# Patient Record
Sex: FEMALE | URBAN - METROPOLITAN AREA
[De-identification: names, ages, dates, MRNs, and addresses within clinical notes are randomized per-mention and may not be internally consistent; named-entity substitution may affect disease eponyms.]

---

## 2023-08-04 ENCOUNTER — OFFICE (OUTPATIENT)
Dept: URBAN - METROPOLITAN AREA CLINIC 76 | Facility: CLINIC | Age: 28
Setting detail: OPHTHALMOLOGY
End: 2023-08-04
Payer: COMMERCIAL

## 2023-08-04 DIAGNOSIS — H35.013: ICD-10-CM

## 2023-08-04 DIAGNOSIS — G43.109: ICD-10-CM

## 2023-08-04 PROCEDURE — 92004 COMPRE OPH EXAM NEW PT 1/>: CPT | Performed by: OPHTHALMOLOGY

## 2023-08-04 PROCEDURE — 92134 CPTRZ OPH DX IMG PST SGM RTA: CPT | Performed by: OPHTHALMOLOGY

## 2023-08-04 PROCEDURE — 92083 EXTENDED VISUAL FIELD XM: CPT | Performed by: OPHTHALMOLOGY

## 2023-08-04 ASSESSMENT — REFRACTION_AUTOREFRACTION
OD_AXIS: 80
OS_SPHERE: -4.00
OS_AXIS: 98
OS_CYLINDER: -1.00
OD_SPHERE: -4.00
OD_CYLINDER: -1.25

## 2023-08-04 ASSESSMENT — REFRACTION_CURRENTRX
OS_CYLINDER: -0.75
OD_SPHERE: -4.25
OS_SPHERE: -4.25
OS_OVR_VA: 20/
OD_CYLINDER: -0.75
OS_AXIS: 96
OD_OVR_VA: 20/
OD_AXIS: 79

## 2023-08-04 ASSESSMENT — VISUAL ACUITY
OS_BCVA: 20/20
OD_BCVA: 20/25

## 2023-08-04 ASSESSMENT — TONOMETRY
OS_IOP_MMHG: 16
OD_IOP_MMHG: 15

## 2023-08-04 ASSESSMENT — KERATOMETRY
OD_K1POWER_DIOPTERS: 45.00
OD_AXISANGLE_DEGREES: 51
OS_K2POWER_DIOPTERS: 45.50
OS_AXISANGLE_DEGREES: 24
OS_K1POWER_DIOPTERS: 45.00
OD_K2POWER_DIOPTERS: 45.50

## 2023-08-04 ASSESSMENT — REFRACTION_MANIFEST
OS_SPHERE: -4.25
OD_SPHERE: -4.00
OS_CYLINDER: -0.75
OD_CYLINDER: -1.00
OS_VA1: 20/20
OS_AXIS: 96
OD_VA1: 20/25
OD_AXIS: 80

## 2023-08-04 ASSESSMENT — AXIALLENGTH_DERIVED
OD_AL: 24.8109
OD_AL: 24.7572
OS_AL: 24.8109
OS_AL: 24.7572

## 2023-08-04 ASSESSMENT — SPHEQUIV_DERIVED
OD_SPHEQUIV: -4.625
OS_SPHEQUIV: -4.5
OD_SPHEQUIV: -4.5
OS_SPHEQUIV: -4.625

## 2023-08-04 ASSESSMENT — CONFRONTATIONAL VISUAL FIELD TEST (CVF)
OD_FINDINGS: FULL
OS_FINDINGS: FULL

## 2024-08-13 ENCOUNTER — APPOINTMENT (OUTPATIENT)
Dept: OTOLARYNGOLOGY | Facility: CLINIC | Age: 29
End: 2024-08-13
Payer: COMMERCIAL

## 2024-08-13 VITALS — BODY MASS INDEX: 23.54 KG/M2 | WEIGHT: 150 LBS | HEIGHT: 67 IN

## 2024-08-13 DIAGNOSIS — M26.609 UNSPECIFIED TEMPOROMANDIBULAR JOINT DISORDER: ICD-10-CM

## 2024-08-13 DIAGNOSIS — Z83.3 FAMILY HISTORY OF DIABETES MELLITUS: ICD-10-CM

## 2024-08-13 DIAGNOSIS — Z87.09 PERSONAL HISTORY OF OTHER DISEASES OF THE RESPIRATORY SYSTEM: ICD-10-CM

## 2024-08-13 DIAGNOSIS — Z78.9 OTHER SPECIFIED HEALTH STATUS: ICD-10-CM

## 2024-08-13 DIAGNOSIS — H69.92 UNSPECIFIED EUSTACHIAN TUBE DISORDER, LEFT EAR: ICD-10-CM

## 2024-08-13 PROBLEM — Z00.00 ENCOUNTER FOR PREVENTIVE HEALTH EXAMINATION: Status: ACTIVE | Noted: 2024-08-13

## 2024-08-13 PROCEDURE — 92557 COMPREHENSIVE HEARING TEST: CPT

## 2024-08-13 PROCEDURE — 92567 TYMPANOMETRY: CPT

## 2024-08-13 PROCEDURE — 99203 OFFICE O/P NEW LOW 30 MIN: CPT

## 2024-08-13 RX ORDER — ETONOGESTREL AND ETHINYL ESTRADIOL .12; .015 MG/D; MG/D
INSERT, EXTENDED RELEASE VAGINAL
Refills: 0 | Status: ACTIVE | COMMUNITY

## 2024-08-13 NOTE — ASSESSMENT
[FreeTextEntry1] : the audiogram was ordered by me and interpreted by me today and the results are as follows- Normal symmetric pure-tone hearing, speech discrimination scores and tympanograms. My differential diagnosis includes but is not limited to eustachian tube dysfunction and TMJ D. Recommending Afrin when flying. Recommending Flonase to be used on a regular basis for the next several weeks/months. Follow-up if symptoms persist or progress.

## 2024-08-13 NOTE — HISTORY OF PRESENT ILLNESS
[de-identified] : Initial visit. Chief complaint is "ear pain (left)". The problem developed 2 months ago which she is describing as a feeling of congestion in her left ear.  This happens often when she is working out. She has had some vertiginous episodes associated with this. She has a known history of bruxism and uses a bite block. She had air travel which exacerbated the problem.  She has air travel to [place] that is coming up.